# Patient Record
Sex: FEMALE | Race: BLACK OR AFRICAN AMERICAN | Employment: STUDENT | URBAN - METROPOLITAN AREA
[De-identification: names, ages, dates, MRNs, and addresses within clinical notes are randomized per-mention and may not be internally consistent; named-entity substitution may affect disease eponyms.]

---
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
## 2022-06-29 ENCOUNTER — OFFICE VISIT (OUTPATIENT)
Dept: FAMILY MEDICINE CLINIC | Age: 22
End: 2022-06-29
Payer: COMMERCIAL

## 2022-06-29 VITALS
BODY MASS INDEX: 23.19 KG/M2 | DIASTOLIC BLOOD PRESSURE: 58 MMHG | WEIGHT: 126 LBS | RESPIRATION RATE: 16 BRPM | TEMPERATURE: 96.6 F | HEIGHT: 62 IN | OXYGEN SATURATION: 99 % | HEART RATE: 59 BPM | SYSTOLIC BLOOD PRESSURE: 112 MMHG

## 2022-06-29 DIAGNOSIS — Z11.3 SCREEN FOR STD (SEXUALLY TRANSMITTED DISEASE): ICD-10-CM

## 2022-06-29 DIAGNOSIS — N76.0 BV (BACTERIAL VAGINOSIS): Primary | ICD-10-CM

## 2022-06-29 DIAGNOSIS — B96.89 BV (BACTERIAL VAGINOSIS): Primary | ICD-10-CM

## 2022-06-29 LAB
REASON FOR REJECTION: NORMAL
REJECTED TEST: NORMAL

## 2022-06-29 PROCEDURE — 99213 OFFICE O/P EST LOW 20 MIN: CPT | Performed by: NURSE PRACTITIONER

## 2022-06-29 RX ORDER — METRONIDAZOLE 7.5 MG/G
1 GEL VAGINAL NIGHTLY
Qty: 70 G | Refills: 0 | Status: SHIPPED | OUTPATIENT
Start: 2022-06-29 | End: 2022-07-04

## 2022-06-29 SDOH — ECONOMIC STABILITY: FOOD INSECURITY: WITHIN THE PAST 12 MONTHS, YOU WORRIED THAT YOUR FOOD WOULD RUN OUT BEFORE YOU GOT MONEY TO BUY MORE.: NEVER TRUE

## 2022-06-29 SDOH — ECONOMIC STABILITY: FOOD INSECURITY: WITHIN THE PAST 12 MONTHS, THE FOOD YOU BOUGHT JUST DIDN'T LAST AND YOU DIDN'T HAVE MONEY TO GET MORE.: NEVER TRUE

## 2022-06-29 ASSESSMENT — PATIENT HEALTH QUESTIONNAIRE - PHQ9
SUM OF ALL RESPONSES TO PHQ QUESTIONS 1-9: 0
SUM OF ALL RESPONSES TO PHQ9 QUESTIONS 1 & 2: 0
2. FEELING DOWN, DEPRESSED OR HOPELESS: 0
SUM OF ALL RESPONSES TO PHQ QUESTIONS 1-9: 0
1. LITTLE INTEREST OR PLEASURE IN DOING THINGS: 0

## 2022-06-29 ASSESSMENT — ENCOUNTER SYMPTOMS
BACK PAIN: 0
VOMITING: 0
CONSTIPATION: 0
NAUSEA: 0
ABDOMINAL PAIN: 0
DIARRHEA: 0
SORE THROAT: 0

## 2022-06-29 ASSESSMENT — SOCIAL DETERMINANTS OF HEALTH (SDOH): HOW HARD IS IT FOR YOU TO PAY FOR THE VERY BASICS LIKE FOOD, HOUSING, MEDICAL CARE, AND HEATING?: NOT HARD AT ALL

## 2022-06-29 NOTE — PROGRESS NOTES
chlaSubjective:     Patient ID: Michelle Olivier is a 23 y.o. female who presentstoday for:  Chief Complaint   Patient presents with    Annual Exam     Pt. is here for an annual exam, no Pap. Vaginal Discharge  The patient's primary symptoms include a genital odor and vaginal discharge. The patient's pertinent negatives include no genital itching, genital lesions, genital rash, missed menses, pelvic pain or vaginal bleeding. This is a new problem. The current episode started in the past 7 days. The problem occurs daily. The problem has been unchanged. The patient is experiencing no pain. She is not pregnant. Pertinent negatives include no abdominal pain, anorexia, back pain, chills, constipation, diarrhea, discolored urine, dysuria, fever, flank pain, frequency, headaches, hematuria, joint pain, joint swelling, nausea, painful intercourse, rash, sore throat, urgency or vomiting. The vaginal discharge was malodorous. There has been no bleeding. She has not been passing clots. She has not been passing tissue. Nothing aggravates the symptoms. She has tried nothing for the symptoms. She is not sexually active. No, her partner does not have an STD. Her menstrual history has been regular. No past medical history on file. No current outpatient medications on file prior to visit. No current facility-administered medications on file prior to visit. No past surgical history on file. No family history on file.   Social History     Socioeconomic History    Marital status: Single     Spouse name: Not on file    Number of children: Not on file    Years of education: Not on file    Highest education level: Not on file   Occupational History    Not on file   Tobacco Use    Smoking status: Former Smoker     Quit date: 2022     Years since quittin.3    Smokeless tobacco: Never Used   Vaping Use    Vaping Use: Never used   Substance and Sexual Activity    Alcohol use: Never    Drug use: Not on file    Sexual activity: Not on file   Other Topics Concern    Not on file   Social History Narrative    Not on file     Social Determinants of Health     Financial Resource Strain: Low Risk     Difficulty of Paying Living Expenses: Not hard at all   Food Insecurity: No Food Insecurity    Worried About Running Out of Food in the Last Year: Never true    [address] in the Last Year: Never true   Transportation Needs:     Lack of Transportation (Medical): Not on file    Lack of Transportation (Non-Medical): Not on file   Physical Activity:     Days of Exercise per Week: Not on file    Minutes of Exercise per Session: Not on file   Stress:     Feeling of Stress : Not on file   Social Connections:     Frequency of Communication with Friends and Family: Not on file    Frequency of Social Gatherings with Friends and Family: Not on file    Attends Congregational Services: Not on file    Active Member of [address] [place] [address] [employer] or Organizations: Not on file    Attends Club or Organization Meetings: Not on file    Marital Status: Not on file   Intimate Partner Violence:     Fear of Current or Ex-Partner: Not on file    Emotionally Abused: Not on file    Physically Abused: Not on file    Sexually Abused: Not on file   Housing Stability:     Unable to Pay for Housing in the Last Year: Not on file    Number of [place] in the Last Year: Not on file    Unstable Housing in the Last Year: Not on file     Allergies:  Patient has no known allergies. Review of Systems   Constitutional: Negative for chills and fever. HENT: Negative for sore throat. Gastrointestinal: Negative for abdominal pain, anorexia, constipation, diarrhea, nausea and vomiting. Genitourinary: Positive for vaginal discharge. Negative for dysuria, flank pain, frequency, hematuria, missed menses, pelvic pain and urgency. Musculoskeletal: Negative for back pain and joint pain. Skin: Negative for rash. Neurological: Negative for headaches. Objective:    BP (!) 112/58 (Site: Right Upper Arm, Position: Sitting, Cuff Size: Medium Adult)   Pulse 59   Temp (!) 96.6 °F (35.9 °C) (Infrared)   Resp 16   Ht 5' 2\" (1.575 m)   Wt 126 lb (57.2 kg)   SpO2 99%   Breastfeeding No   BMI 23.05 kg/m²     Physical Exam  Constitutional:       General: She is not in acute distress. HENT:      Head: Normocephalic and atraumatic. Mouth/Throat:      Mouth: Mucous membranes are moist.   Eyes:      Conjunctiva/sclera: Conjunctivae normal.   Cardiovascular:      Rate and Rhythm: Normal rate and regular rhythm. Heart sounds: Normal heart sounds. Pulmonary:      Effort: Pulmonary effort is normal. No respiratory distress. Breath sounds: Normal breath sounds. Abdominal:      General: Abdomen is flat. Bowel sounds are normal. There is no distension. Palpations: Abdomen is soft. Tenderness: There is no abdominal tenderness. There is no right CVA tenderness, left CVA tenderness, guarding or rebound. Musculoskeletal:      Cervical back: Neck supple. Right lower leg: No edema. Left lower leg: No edema. Skin:     General: Skin is warm and dry. Findings: No rash. Neurological:      Mental Status: She is alert and oriented to person, place, and time. Psychiatric:         Mood and Affect: Mood normal.         Behavior: Behavior normal.         Assessment & Plan:       Diagnosis Orders   1. BV (bacterial vaginosis)  metroNIDAZOLE (METROGEL) 0.75 % vaginal gel   2.  Screen for STD (sexually transmitted disease)  C.trachomatis N.gonorrhoeae DNA, Urine    Trichomonas Vaginalis Rna, Qual Tma, Pap Via    Rpr    HIV Screen    Hepatitis C Antibody     Orders Placed This Encounter   Procedures    C.trachomatis N.gonorrhoeae DNA, Urine     Standing Status:   Future     Standing Expiration Date:   6/29/2023    Trichomonas Vaginalis Rna, Qual Tma, Pap Via     Standing Status:   Future     Standing Expiration Date:   6/29/2023    Rpr Standing Status:   Future     Standing Expiration Date:   6/29/2023    HIV Screen     Standing Status:   Future     Standing Expiration Date:   6/29/2023    Hepatitis C Antibody     Standing Status:   Future     Standing Expiration Date:   6/29/2023     Orders Placed This Encounter   Medications    metroNIDAZOLE (METROGEL) 0.75 % vaginal gel     Sig: Place 1 Applicatorful vaginally at bedtime for 5 days     Dispense:  70 g     Refill:  0     There are no discontinued medications. No follow-ups on file. Reviewed with the patient: currentclinical status, medications, activities and diet. Side effects, adverse effects of the medicationprescribed today, as well as treatment plan/ rationale and result expectations havebeen discussed with the patient who expresses understanding and desires to proceed. Pt instructions reviewed and given to patient.     Close follow up to evaluate treatment resultsand for coordination of care. I have reviewed the patient's medical historyin detail and updated the computerized patient record.     Warren Pringle, APRN - CNP

## 2022-06-30 ENCOUNTER — TELEPHONE (OUTPATIENT)
Dept: FAMILY MEDICINE CLINIC | Age: 22
End: 2022-06-30

## 2022-06-30 DIAGNOSIS — Z11.3 SCREEN FOR STD (SEXUALLY TRANSMITTED DISEASE): Primary | ICD-10-CM

## 2022-07-05 ENCOUNTER — OFFICE VISIT (OUTPATIENT)
Dept: FAMILY MEDICINE CLINIC | Age: 22
End: 2022-07-05
Payer: OTHER GOVERNMENT

## 2022-07-05 VITALS
HEIGHT: 62 IN | HEART RATE: 83 BPM | WEIGHT: 128 LBS | OXYGEN SATURATION: 97 % | TEMPERATURE: 97.3 F | BODY MASS INDEX: 23.55 KG/M2 | DIASTOLIC BLOOD PRESSURE: 60 MMHG | SYSTOLIC BLOOD PRESSURE: 110 MMHG

## 2022-07-05 DIAGNOSIS — N89.8 VAGINAL DISCHARGE: ICD-10-CM

## 2022-07-05 DIAGNOSIS — A54.9 GONORRHEA: ICD-10-CM

## 2022-07-05 DIAGNOSIS — R10.2 PELVIC PAIN: Primary | ICD-10-CM

## 2022-07-05 DIAGNOSIS — R10.2 PELVIC PAIN: ICD-10-CM

## 2022-07-05 DIAGNOSIS — Z76.89 ENCOUNTER TO ESTABLISH CARE: ICD-10-CM

## 2022-07-05 LAB
BILIRUBIN, POC: ABNORMAL
BLOOD URINE, POC: ABNORMAL
CLARITY, POC: CLEAR
COLOR, POC: YELLOW
GLUCOSE URINE, POC: ABNORMAL
KETONES, POC: ABNORMAL
LEUKOCYTE EST, POC: 15
NITRITE, POC: ABNORMAL
PH, POC: 6
PROTEIN, POC: ABNORMAL
SPECIFIC GRAVITY, POC: 1.03
UROBILINOGEN, POC: 3.5

## 2022-07-05 PROCEDURE — 81003 URINALYSIS AUTO W/O SCOPE: CPT | Performed by: NURSE PRACTITIONER

## 2022-07-05 PROCEDURE — 99214 OFFICE O/P EST MOD 30 MIN: CPT | Performed by: NURSE PRACTITIONER

## 2022-07-05 NOTE — PATIENT INSTRUCTIONS
Patient Education        Pelvic Pain: Care Instructions  Your Care Instructions     Pelvic pain, or pain in the lower belly, can have many causes. Often pelvic pain is not serious and gets better in a few days. If your pain continues or gets worse, you may need tests and treatment. Tell your doctor about any newsymptoms. These may be signs of a serious problem. Follow-up care is a key part of your treatment and safety. Be sure to make and go to all appointments, and call your doctor if you are having problems. It's also a good idea to know your test results and keep alist of the medicines you take. How can you care for yourself at home?  Rest until you feel better. Lie down, and raise your legs by placing a pillow under your knees.  Drink plenty of fluids. You may find that small, frequent sips are easier on your stomach than if you drink a lot at once. Avoid drinks with carbonation or caffeine, such as soda pop, tea, or coffee.  Try eating several small meals instead of 2 or 3 large ones. Eat mild foods, such as rice, dry toast or crackers, bananas, and applesauce. Avoid fatty and spicy foods, other fruits, and alcohol until 48 hours after your symptoms have gone away.  Take an over-the-counter pain medicine, such as acetaminophen (Tylenol), ibuprofen (Advil, Motrin), or naproxen (Aleve). Read and follow all instructions on the label.  Do not take two or more pain medicines at the same time unless the doctor told you to. Many pain medicines have acetaminophen, which is Tylenol. Too much acetaminophen (Tylenol) can be harmful.  You can put a heating pad, a warm cloth, or moist heat on your belly to relieve pain. When should you call for help? Call 911  anytime you think you may need emergency care. For example, call if:     You passed out (lost consciousness). Call your doctor now or seek immediate medical care if:     You have a new or higher fever.      You have unusual vaginal bleeding.    You have new or worse belly or pelvic pain.      You have vaginal discharge that has increased in amount or smells bad.      You are dizzy or lightheaded, or you feel like you may faint.      You have symptoms of sepsis, such as:  ? Shortness of breath. ? Feeling very sick. ? Severe pain. ? A fast heart rate. ? Cool, pale, or clammy skin. ? Feeling confused. ? Feeling very sleepy, or you are hard to wake up. Watch closely for changes in your health, and be sure to contact your doctor if:     You do not get better as expected. Where can you learn more? Go to [email or web address].Photos to Photos. org and sign in to your [employer] account. Enter [phone] in the Cloud[employer] box to learn more about \"Pelvic Pain: Care Instructions. \"     If you do not have an account, please click on the \"Sign Up Now\" link. Current as of: November 22, 2021               Content Version: 13.3  © 4566-4037 Healthwise, Incorporated. Care instructions adapted under license by [facility] ([facility]). If you have questions about a medical condition or this instruction, always ask your healthcare professional. [address] any warranty or liability for your use of this information.

## 2022-07-05 NOTE — PROGRESS NOTES
Subjective:      Patient ID: Tharon Harada is a 23 y.o. female who presents today for:  Chief Complaint   Patient presents with    Other     Patient states pain in the pelvis area on the right side been happening for 2 weeks. Ramone White HPI      EVery right step it hurts, especially after walking all day   This has been for 2 weeks   It has progressively gotten worse   No back pain   Constantly there but worse with activity   shes never had a pelvic exam   shes never been pregnant   Her periods are pretty normal   She went off birth control in August and they are regulating   She is having abnormal discharge   The color is green   It smells a little   No trouble urinating   Pain with intercourse   No bleeding after intercourse   Feels like her cervix is very tender   Like the very middle is very tender   She never did the Flagy gel that she was recently prescribed  She does not have a hx of BV   No burning or frequency of urination   LMP    Had it for one week and was done and then it came back         History reviewed. No pertinent past medical history. History reviewed. No pertinent surgical history.   Social History     Socioeconomic History    Marital status: Single     Spouse name: Not on file    Number of children: Not on file    Years of education: Not on file    Highest education level: Not on file   Occupational History    Not on file   Tobacco Use    Smoking status: Former Smoker     Quit date: 2022     Years since quittin.3    Smokeless tobacco: Never Used   Vaping Use    Vaping Use: Never used   Substance and Sexual Activity    Alcohol use: Never    Drug use: Not on file    Sexual activity: Not on file   Other Topics Concern    Not on file   Social History Narrative    Not on file     Social Determinants of Health     Financial Resource Strain: Low Risk     Difficulty of Paying Living Expenses: Not hard at all   Food Insecurity: No Food Insecurity    Worried About Running Out of Food in the Last Year: Never true    [address] in the Last Year: Never true   Transportation Needs:     Lack of Transportation (Medical): Not on file    Lack of Transportation (Non-Medical): Not on file   Physical Activity:     Days of Exercise per Week: Not on file    Minutes of Exercise per Session: Not on file   Stress:     Feeling of Stress : Not on file   Social Connections:     Frequency of Communication with Friends and Family: Not on file    Frequency of Social Gatherings with Friends and Family: Not on file    Attends Orthodoxy Services: Not on file    Active Member of [address] [place] [address] or Organizations: Not on file    Attends Club or Organization Meetings: Not on file    Marital Status: Not on file   Intimate Partner Violence:     Fear of Current or Ex-Partner: Not on file    Emotionally Abused: Not on file    Physically Abused: Not on file    Sexually Abused: Not on file   Housing Stability:     Unable to Pay for Housing in the Last Year: Not on file    Number of [place] in the Last Year: Not on file    Unstable Housing in the Last Year: Not on file     History reviewed. No pertinent family history. No Known Allergies  Current Outpatient Medications   Medication Sig Dispense Refill    metroNIDAZOLE (FLAGYL) 500 MG tablet Take 1 tablet by mouth 2 times daily for 7 days 14 tablet 0     Current Facility-Administered Medications   Medication Dose Route Frequency Provider Last Rate Last Admin    [START ON 7/15/2022] cefTRIAXone (ROCEPHIN) injection 500 mg  500 mg IntraMUSCular Once Marsha Diaz, APRN - CNP              Review of Systems   Constitutional: Negative for chills, fatigue and fever. HENT: Negative for congestion, rhinorrhea and sore throat. Respiratory: Negative for cough, shortness of breath and wheezing. Gastrointestinal: Negative for diarrhea, nausea and vomiting. Genitourinary: Positive for pelvic pain, vaginal discharge and vaginal pain.  Negative for difficulty urinating, dysuria, flank pain, frequency and vaginal bleeding. Musculoskeletal: Negative for myalgias. Skin: Negative for rash. Neurological: Negative for headaches. Psychiatric/Behavioral: Negative for agitation, confusion and hallucinations. Objective:   /60   Pulse 83   Temp 97.3 °F (36.3 °C) (Infrared)   Ht 5' 2\" (1.575 m)   Wt 128 lb (58.1 kg)   SpO2 97%   BMI 23.41 kg/m²     Physical Exam  Vitals reviewed. Chaperone present: Pt declined    Constitutional:       Appearance: Normal appearance. HENT:      Head: Normocephalic and atraumatic. Nose: Nose normal.      Mouth/Throat:      Lips: Pink. Eyes:      General: Lids are normal.      Conjunctiva/sclera: Conjunctivae normal.   Cardiovascular:      Rate and Rhythm: Normal rate. Pulmonary:      Effort: Pulmonary effort is normal.   Abdominal:      Tenderness: There is no abdominal tenderness. There is no right CVA tenderness, left CVA tenderness or guarding. Genitourinary:     Exam position: Lithotomy position. Pubic Area: No rash. Labia:         Right: No rash. Left: No rash. Urethra: No urethral swelling. Vagina: Vaginal discharge present. Cervix: Discharge and erythema present. No cervical motion tenderness, friability or cervical bleeding. Adnexa:         Right: Tenderness present. No mass. Left: No mass or tenderness. Comments: Some white milky discharge and some clear discharge   Musculoskeletal:      Cervical back: Normal range of motion. Skin:     General: Skin is warm and dry. Findings: No rash. Neurological:      General: No focal deficit present. Mental Status: She is alert and oriented to person, place, and time. Psychiatric:         Mood and Affect: Mood normal.         Behavior: Behavior normal. Behavior is cooperative. Assessment:       Diagnosis Orders   1.  Pelvic pain  [address] [facility], [place], [place], N.gonorrhoeae DNA     Standing Status:   Future     Number of Occurrences:   1     Standing Expiration Date:   7/5/2023    Trichomonas Vaginalis Rna, Qual Tma, Pap Via     Standing Status:   Future     Number of Occurrences:   1     Standing Expiration Date:   7/5/2023   [address], [facility], [facility]     Referral Priority:   Routine     Referral Type:   Eval and Treat     Referral Reason:   Specialty Services Required     Referred to Provider:   Melinda Juárez DO     Requested Specialty:   Obstetrics & Gynecology     Number of Visits Requested:   1    POCT Urinalysis No Micro (Auto)     Orders Placed This Encounter   Medications    cefTRIAXone (ROCEPHIN) injection 500 mg     Order Specific Question:   Antimicrobial Indications     Answer:   OB/Gyn Infection     Order Specific Question:   Suspected Organism(s)     Answer:   Gonorrhea     There are no discontinued medications. Return for Follow up with PCP, OBGYN 2 weeks. Reviewed with the patient/family: current clinical status & medications. Side effects of the medication prescribed today, as well as treatment plan/rationale and result expectations have been discussed with the patient/family who expresses understanding. Patient will be discharged home in stable condition. Follow up with PCP to evaluate treatment results or return if symptoms worsen or fail to improve. Discussed signs and symptoms which require immediate follow-up in ED/call to 911. Understanding verbalized. I have reviewed the patient's medical history in detail and updated the computerized patient record.     DIMITRIOS Kim - CNP

## 2022-07-06 ENCOUNTER — TELEPHONE (OUTPATIENT)
Dept: FAMILY MEDICINE CLINIC | Age: 22
End: 2022-07-06

## 2022-07-06 DIAGNOSIS — N76.0 BACTERIAL VAGINOSIS: Primary | ICD-10-CM

## 2022-07-06 DIAGNOSIS — B96.89 BACTERIAL VAGINOSIS: Primary | ICD-10-CM

## 2022-07-06 LAB
CLUE CELLS: ABNORMAL
TRICHOMONAS PREP: ABNORMAL
TRICHOMONAS VAGINALIS SCREEN: NEGATIVE
YEAST WET PREP: ABNORMAL

## 2022-07-06 RX ORDER — METRONIDAZOLE 500 MG/1
500 TABLET ORAL 2 TIMES DAILY
Qty: 14 TABLET | Refills: 0 | Status: SHIPPED | OUTPATIENT
Start: 2022-07-06 | End: 2022-07-13

## 2022-07-07 LAB — URINE CULTURE, ROUTINE: NORMAL

## 2022-07-08 LAB
C TRACH DNA GENITAL QL NAA+PROBE: NEGATIVE
N. GONORRHOEAE DNA: POSITIVE
SPECIMEN SOURCE: NORMAL
T. VAGINALIS AMPLIFIED: NEGATIVE

## 2022-07-12 LAB
C. TRACHOMATIS DNA ,URINE: NEGATIVE
N. GONORRHOEAE DNA, URINE: POSITIVE

## 2022-07-13 RX ORDER — CEFTRIAXONE 1 G/1
500 INJECTION, POWDER, FOR SOLUTION INTRAMUSCULAR; INTRAVENOUS ONCE
Status: SHIPPED | OUTPATIENT
Start: 2022-07-15

## 2022-07-13 ASSESSMENT — ENCOUNTER SYMPTOMS
SORE THROAT: 0
VOMITING: 0
DIARRHEA: 0
RHINORRHEA: 0
COUGH: 0
NAUSEA: 0
WHEEZING: 0
SHORTNESS OF BREATH: 0

## 2022-07-20 ENCOUNTER — TELEPHONE (OUTPATIENT)
Dept: FAMILY MEDICINE CLINIC | Age: 22
End: 2022-07-20

## 2022-07-20 NOTE — TELEPHONE ENCOUNTER
----- Message from Yolanda Altman sent at 7/19/2022  2:01 PM EDT -----  Subject: Message to Provider    QUESTIONS  Information for Provider? Pt is calling in and stated got test results but   was told by provider that pt did not need a appointment for treatment but   just to check in . If someone can give the pt a call .   ---------------------------------------------------------------------------  --------------  Ky Fraction INFO  [phone]; OK to leave message on voicemail  ---------------------------------------------------------------------------  --------------  SCRIPT ANSWERS  Relationship to Patient?  Self

## 2022-10-02 ENCOUNTER — HOSPITAL ENCOUNTER (EMERGENCY)
Age: 22
Discharge: HOME | End: 2022-10-02
Attending: EMERGENCY MEDICINE
Payer: OTHER GOVERNMENT

## 2022-10-02 VITALS
TEMPERATURE: 98.1 F | RESPIRATION RATE: 16 BRPM | OXYGEN SATURATION: 100 % | HEIGHT: 61 IN | HEART RATE: 66 BPM | DIASTOLIC BLOOD PRESSURE: 75 MMHG | BODY MASS INDEX: 24.55 KG/M2 | WEIGHT: 130 LBS | SYSTOLIC BLOOD PRESSURE: 117 MMHG

## 2022-10-02 DIAGNOSIS — H10.89 CONTACT LENS RELATED CONJUNCTIVITIS: Primary | ICD-10-CM
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
## 2022-10-02 PROCEDURE — 99283 EMERGENCY DEPT VISIT LOW MDM: CPT

## 2022-10-02 PROCEDURE — 6370000000 HC RX 637 (ALT 250 FOR IP): Performed by: EMERGENCY MEDICINE

## 2022-10-02 RX ORDER — CIPROFLOXACIN HYDROCHLORIDE 3.5 MG/ML
2 SOLUTION/ DROPS TOPICAL
Qty: 1 EACH | Refills: 0 | Status: SHIPPED | OUTPATIENT
Start: 2022-10-02 | End: 2022-10-04

## 2022-10-02 RX ADMIN — FLUORESCEIN SODIUM 1 MG: 1 STRIP OPHTHALMIC at 13:35

## 2022-10-02 ASSESSMENT — LIFESTYLE VARIABLES
HOW OFTEN DO YOU HAVE A DRINK CONTAINING ALCOHOL: NEVER
HOW MANY STANDARD DRINKS CONTAINING ALCOHOL DO YOU HAVE ON A TYPICAL DAY: PATIENT DOES NOT DRINK

## 2022-10-02 ASSESSMENT — PAIN - FUNCTIONAL ASSESSMENT: PAIN_FUNCTIONAL_ASSESSMENT: 0-10

## 2022-10-02 ASSESSMENT — ENCOUNTER SYMPTOMS
EYE PAIN: 0
SORE THROAT: 0
VOMITING: 0
SHORTNESS OF BREATH: 0
ABDOMINAL PAIN: 0
EYE REDNESS: 1
CHEST TIGHTNESS: 0
EYE DISCHARGE: 1
NAUSEA: 0

## 2022-10-02 ASSESSMENT — PAIN DESCRIPTION - DESCRIPTORS: DESCRIPTORS: ACHING

## 2022-10-02 ASSESSMENT — PAIN DESCRIPTION - PAIN TYPE: TYPE: ACUTE PAIN

## 2022-10-02 ASSESSMENT — PAIN DESCRIPTION - LOCATION: LOCATION: EYE

## 2022-10-02 ASSESSMENT — PAIN DESCRIPTION - FREQUENCY: FREQUENCY: CONTINUOUS

## 2022-10-02 ASSESSMENT — PAIN DESCRIPTION - ONSET: ONSET: SUDDEN

## 2022-10-02 ASSESSMENT — PAIN DESCRIPTION - ORIENTATION: ORIENTATION: RIGHT

## 2022-10-02 ASSESSMENT — PAIN SCALES - GENERAL: PAINLEVEL_OUTOF10: 4

## 2022-10-02 NOTE — ED TRIAGE NOTES
Pt arrived c/o both eye contacts stuck in her eyes, pt states they have been in for three days, the right eye hurts.

## 2022-10-02 NOTE — ED PROVIDER NOTES
[address] [facility] ED  EMERGENCY DEPARTMENT ENCOUNTER      Pt Name: Christie Virgen  MRN: [id number]  [place] 2000  Date of evaluation: 10/2/2022  Provider: Ainsley Thompson, [address] [place] [address]       Chief Complaint   Patient presents with    Eye Problem     Contact lens         HISTORY OF PRESENT ILLNESS   (Location/Symptom, Timing/Onset, Context/Setting, Quality, Duration, Modifying Factors, Severity)  Note limiting factors. Christie Virgen is a 23 y.o. female who presents to the emergency department . Comes in because she could not get her contact lenses out for the last 3 days. She states she usually wears glasses and never sleeps in her contacts. She is been trying to get the contact out more so in the right eye and the right eye is very red and irritated. Eye is tearing but there is no purulent drainage. Vision is not impaired    HPI    Nursing Notes were reviewed. REVIEW OF SYSTEMS    (2-9 systems for level 4, 10 or more for level 5)     Review of Systems   Constitutional:  Negative for activity change, appetite change and fatigue. HENT:  Negative for congestion and sore throat. Eyes:  Positive for discharge and redness. Negative for pain and visual disturbance. Respiratory:  Negative for chest tightness and shortness of breath. Cardiovascular:  Negative for chest pain. Gastrointestinal:  Negative for abdominal pain, nausea and vomiting. Endocrine: Negative for polydipsia. Genitourinary:  Negative for flank pain and urgency. Musculoskeletal:  Negative for gait problem and neck stiffness. Skin:  Negative for rash. Neurological:  Negative for weakness, light-headedness and headaches. Psychiatric/Behavioral:  Negative for confusion and sleep disturbance. Except as noted above the remainder of the review of systems was reviewed and negative. PAST MEDICAL HISTORY   History reviewed. No pertinent past medical history. SURGICAL HISTORY     History reviewed. No pertinent surgical history. CURRENT MEDICATIONS       Discharge Medication List as of 10/2/2022  2:24 PM          ALLERGIES     Patient has no known allergies. FAMILY HISTORY     History reviewed. No pertinent family history. SOCIAL HISTORY       Social History     Socioeconomic History    Marital status: Single     Spouse name: None    Number of children: None    Years of education: None    Highest education level: None   Tobacco Use    Smoking status: Former     Types: Cigarettes     Quit date: 2022     Years since quittin.5    Smokeless tobacco: Never   Vaping Use    Vaping Use: Never used   Substance and Sexual Activity    Alcohol use: Never     Social Determinants of Health     Financial Resource Strain: Low Risk     Difficulty of Paying Living Expenses: Not hard at all   Food Insecurity: No Food Insecurity    Worried About [address] in the Last Year: Never true    [address] in the Last Year: Never true       SCREENINGS        [place] Coma Scale  Eye Opening: Spontaneous  Best Verbal Response: Oriented  Best Motor Response: Obeys commands  [place] Coma Scale Score: 15               PHYSICAL EXAM    (up to 7 for level 4, 8 or more for level 5)     ED Triage Vitals [10/02/22 1308]   BP Temp Temp Source Heart Rate Resp SpO2 Height Weight   117/75 98.1 °F (36.7 °C) Oral 66 16 100 % 5' 1\" (1.549 m) 130 lb (59 kg)       Physical Exam  Constitutional:       General: She is not in acute distress. Appearance: She is well-developed. She is not diaphoretic. HENT:      Head: Normocephalic and atraumatic. Right Ear: External ear normal.      Left Ear: External ear normal.      Nose: Nose normal.      Mouth/Throat:      Mouth: Mucous membranes are moist.      Pharynx: No oropharyngeal exudate. Eyes:      Extraocular Movements: Extraocular movements intact. Pupils: Pupils are equal, round, and reactive to light. Comments: Right I injected.   No purulent drainage. There are no contact lenses found in either eye nor in the lids. I  both eyes with fluorescein and also did not see abrasions nor contacts. Neck:      Thyroid: No thyromegaly. Vascular: No JVD. Trachea: No tracheal deviation. Cardiovascular:      Rate and Rhythm: Normal rate. Heart sounds: Normal heart sounds. No murmur heard. Pulmonary:      Effort: Pulmonary effort is normal. No respiratory distress. Breath sounds: Normal breath sounds. No wheezing. Abdominal:      General: Bowel sounds are normal.      Palpations: Abdomen is soft. Tenderness: There is no abdominal tenderness. There is no guarding. Musculoskeletal:         General: Normal range of motion. Cervical back: Normal range of motion and neck supple. Right lower leg: No edema. Left lower leg: No edema. Skin:     General: Skin is warm and dry. Findings: No rash. Neurological:      Mental Status: She is alert and oriented to person, place, and time. Cranial Nerves: No cranial nerve deficit. Psychiatric:         Behavior: Behavior normal.       DIAGNOSTIC RESULTS     EKG: All EKG's are interpreted by the Emergency Department Physician who either signs or Co-signs this chart in the absence of a cardiologist.        RADIOLOGY:   Non-plain film images such as CT, Ultrasound and MRI are read by the radiologist. Plain radiographic images are visualized and preliminarily interpreted by the emergency physician with the below findings:        Interpretation per the Radiologist below, if available at the time of this note:    No orders to display         ED BEDSIDE ULTRASOUND:   Performed by ED Physician - none    LABS:  Labs Reviewed - No data to display    All other labs were within normal range or not returned as of this dictation.     EMERGENCY DEPARTMENT COURSE and DIFFERENTIAL DIAGNOSIS/MDM:   Vitals:    Vitals:    10/02/22 1308   BP: 117/75   Pulse: 66   Resp: 16   Temp: 98.1 °F (36.7 °C)   TempSrc: Oral   SpO2: 100%   Weight: 130 lb (59 kg)   Height: 5' 1\" (1.549 m)       Patient came in with eye irritation and thought that both contacts were still in her eyes and she could not get them out. I did not find any contacts nor did she have corneal abrasions. She does have mild conjunctivitis in the right eye. I did prescribe Ciloxan drops. MDM        REASSESSMENT          CRITICAL CARE TIME   Total Critical Care time was 0 minutes, excluding separately reportable procedures. There was a high probability of clinically significant/life threatening deterioration in the patient's condition which required my urgent intervention. CONSULTS:  None    PROCEDURES:  Unless otherwise noted below, none     Procedures      FINAL IMPRESSION      1. Contact lens related conjunctivitis          DISPOSITION/PLAN   DISPOSITION Decision To Discharge 10/02/2022 02:23:10 PM      PATIENT REFERRED TO:  DIMITRIOS Arellano - [facility]  [id number] [phone]      As needed      DISCHARGE MEDICATIONS:  Discharge Medication List as of 10/2/2022  2:24 PM        START taking these medications    Details   ciprofloxacin (CILOXAN) 0.3 % ophthalmic solution Place 2 drops into both eyes every 2 hours for 2 days Then 2 drops in affected eye every 4 hours for 5 days, Disp-1 each, R-0Normal           Controlled Substances Monitoring:     No flowsheet data found.     (Please note that portions of this note were completed with a voice recognition program.  Efforts were made to edit the dictations but occasionally words are mis-transcribed.)    Ashli Hernandez DO (electronically signed)  Attending Emergency Physician            Ashli Hernandez DO  10/02/22 9991

## 2022-11-28 ENCOUNTER — OFFICE VISIT (OUTPATIENT)
Dept: FAMILY MEDICINE CLINIC | Age: 22
End: 2022-11-28
Payer: OTHER GOVERNMENT

## 2022-11-28 VITALS
TEMPERATURE: 97.5 F | RESPIRATION RATE: 16 BRPM | BODY MASS INDEX: 25.11 KG/M2 | OXYGEN SATURATION: 99 % | HEIGHT: 61 IN | WEIGHT: 133 LBS | SYSTOLIC BLOOD PRESSURE: 108 MMHG | DIASTOLIC BLOOD PRESSURE: 80 MMHG | HEART RATE: 85 BPM

## 2022-11-28 DIAGNOSIS — N89.8 VAGINAL DISCHARGE: ICD-10-CM

## 2022-11-28 DIAGNOSIS — Z20.2 POSSIBLE EXPOSURE TO STD: Primary | ICD-10-CM

## 2022-11-28 DIAGNOSIS — Z20.2 POSSIBLE EXPOSURE TO STD: ICD-10-CM

## 2022-11-28 PROCEDURE — 99214 OFFICE O/P EST MOD 30 MIN: CPT | Performed by: NURSE PRACTITIONER

## 2022-11-28 RX ORDER — CEFTRIAXONE 500 MG/1
500 INJECTION, POWDER, FOR SOLUTION INTRAMUSCULAR; INTRAVENOUS ONCE
Qty: 1 EACH | Refills: 0
Start: 2022-11-28 | End: 2022-12-06 | Stop reason: CLARIF

## 2022-11-28 NOTE — PROGRESS NOTES
Subjective:      Patient ID: Rose Townsend is a 23 y.o. female who presents today for:  Chief Complaint   Patient presents with    Other     Possible STD, has white discharge with a slight odor, feels like her uterus is heavy,has pain in lower abdomen, has frequency with urination as well as urgency        HPI      More discharge then normal  Its white  Kind of thick   Does not have a bad odor  Smells acidic   Pelvic discomfort   Shes not had BV before   Shes not having painful intercourse   She is having frequency of urination   Like a rush   Like she has to go   Her bladder is always like but this seems like more urgency then normal   Had Gonorrhea before and worried that its that   Discharge seems similar to that         History reviewed. No pertinent past medical history. History reviewed. No pertinent surgical history.   Social History     Socioeconomic History    Marital status: Single     Spouse name: Not on file    Number of children: Not on file    Years of education: Not on file    Highest education level: Not on file   Occupational History    Not on file   Tobacco Use    Smoking status: Former     Types: Cigarettes     Quit date: 2022     Years since quittin.7    Smokeless tobacco: Never   Vaping Use    Vaping Use: Never used   Substance and Sexual Activity    Alcohol use: Never    Drug use: Not on file    Sexual activity: Not on file   Other Topics Concern    Not on file   Social History Narrative    Not on file     Social Determinants of Health     Financial Resource Strain: Low Risk     Difficulty of Paying Living Expenses: Not hard at all   Food Insecurity: No Food Insecurity    Worried About Running Out of Food in the Last Year: Never true    Ran Out of Food in the Last Year: Never true   Transportation Needs: Not on file   Physical Activity: Not on file   Stress: Not on file   Social Connections: Not on file   Intimate Partner Violence: Not on file   Housing Stability: Not on file History reviewed. No pertinent family history. No Known Allergies  Current Outpatient Medications   Medication Sig Dispense Refill    metroNIDAZOLE (FLAGYL) 500 MG tablet Take 1 tablet by mouth 2 times daily for 7 days 14 tablet 0     No current facility-administered medications for this visit. Review of Systems   Constitutional:  Negative for chills, fatigue and fever. HENT:  Negative for congestion, rhinorrhea and sore throat. Respiratory:  Negative for cough, shortness of breath and wheezing. Gastrointestinal:  Negative for diarrhea, nausea and vomiting. Genitourinary:  Positive for frequency, pelvic pain, urgency and vaginal discharge. Musculoskeletal:  Negative for myalgias. Skin:  Negative for rash. Neurological:  Negative for dizziness, light-headedness and headaches. Psychiatric/Behavioral:  Negative for agitation, confusion and hallucinations. Objective:   /80 (Site: Left Upper Arm, Position: Sitting, Cuff Size: Medium Adult)   Pulse 85   Temp 97.5 °F (36.4 °C) (Temporal)   Resp 16   Ht 5' 1\" (1.549 m)   Wt 133 lb (60.3 kg)   SpO2 99%   BMI 25.13 kg/m²     Physical Exam  Vitals reviewed. Chaperone present: Pt declined. Constitutional:       Appearance: Normal appearance. HENT:      Head: Normocephalic and atraumatic. Nose: Nose normal.      Mouth/Throat:      Lips: Pink. Eyes:      General: Lids are normal.      Conjunctiva/sclera: Conjunctivae normal.   Cardiovascular:      Rate and Rhythm: Normal rate. Pulmonary:      Effort: Pulmonary effort is normal.   Abdominal:      Tenderness: There is no abdominal tenderness. There is no right CVA tenderness, left CVA tenderness or guarding. Genitourinary:     Exam position: Lithotomy position. Pubic Area: No rash. Labia:         Right: No rash. Left: No rash. Urethra: No prolapse or urethral swelling. Vagina: Vaginal discharge (white) present.       Cervix: Erythema present. No cervical bleeding. Musculoskeletal:      Cervical back: Normal range of motion. Skin:     General: Skin is warm and dry. Findings: No rash. Neurological:      General: No focal deficit present. Mental Status: She is alert and oriented to person, place, and time. Psychiatric:         Mood and Affect: Mood normal.         Behavior: Behavior normal. Behavior is cooperative. Assessment:       Diagnosis Orders   1. Possible exposure to STD  Wet Prep, Genital    C.trachomatis N.gonorrhoeae DNA, Urine    Culture, Urine    DISCONTINUED: cefTRIAXone (ROCEPHIN) 500 MG injection      2. Vaginal discharge  Wet Prep, Genital    C.trachomatis N.gonorrhoeae DNA, Urine    DISCONTINUED: cefTRIAXone (ROCEPHIN) 500 MG injection        No results found for this visit on 11/28/22. Plan: At first pt wanted treated ahead of time for STDs, then she changed her mind and would like to wait until testing comes back. Will hold off on any medications for now ans await results. Assessment & Plan   Negar Silver was seen today for other. Diagnoses and all orders for this visit:    Possible exposure to STD  -     Discontinue: cefTRIAXone (ROCEPHIN) 500 MG injection; Inject 500 mg into the muscle once for 1 dose  -     Wet Prep, Genital; Future  -     C.trachomatis N.gonorrhoeae DNA, Urine; Future  -     Culture, Urine; Future    Vaginal discharge  -     Discontinue: cefTRIAXone (ROCEPHIN) 500 MG injection; Inject 500 mg into the muscle once for 1 dose  -     Wet Prep, Genital; Future  -     C.trachomatis N.gonorrhoeae DNA, Urine;  Future    Orders Placed This Encounter   Procedures    Wet Prep, Genital     Standing Status:   Future     Number of Occurrences:   1     Standing Expiration Date:   11/28/2023    C.trachomatis N.gonorrhoeae DNA, Urine     Standing Status:   Future     Number of Occurrences:   1     Standing Expiration Date:   11/28/2023    Culture, Urine     Standing Status:   Future Number of Occurrences:   1     Standing Expiration Date:   11/28/2023     Order Specific Question:   Specify (ex-cath, midstream, cysto, etc)? Answer:   midstream       Orders Placed This Encounter   Medications    DISCONTD: cefTRIAXone (ROCEPHIN) 500 MG injection     Sig: Inject 500 mg into the muscle once for 1 dose     Dispense:  1 each     Refill:  0       Medications Discontinued During This Encounter   Medication Reason    cefTRIAXone (ROCEPHIN) injection 500 mg     cefTRIAXone (ROCEPHIN) 500 MG injection ERROR     Return if symptoms worsen or fail to improve. Reviewed with the patient/family: current clinical status & medications. Side effects of the medication prescribed today, as well as treatment plan/rationale and result expectations have been discussed with the patient/family who expresses understanding. Patient will be discharged home in stable condition. Follow up with PCP to evaluate treatment results or return if symptoms worsen or fail to improve. Discussed signs and symptoms which require immediate follow-up in ED/call to 911. Understanding verbalized. I have reviewed the patient's medical history in detail and updated the computerized patient record.     Komal Sahu, APRN - CNP

## 2022-11-29 LAB
CLUE CELLS: ABNORMAL
TRICHOMONAS PREP: ABNORMAL
TRICHOMONAS VAGINALIS SCREEN: NEGATIVE
URINE CULTURE, ROUTINE: NORMAL
YEAST WET PREP: ABNORMAL

## 2022-11-30 DIAGNOSIS — B96.89 BACTERIAL VAGINOSIS: Primary | ICD-10-CM

## 2022-11-30 DIAGNOSIS — N76.0 BACTERIAL VAGINOSIS: Primary | ICD-10-CM

## 2022-11-30 RX ORDER — METRONIDAZOLE 500 MG/1
500 TABLET ORAL 2 TIMES DAILY
Qty: 14 TABLET | Refills: 0 | Status: SHIPPED | OUTPATIENT
Start: 2022-11-30 | End: 2022-12-07

## 2022-12-03 LAB
C. TRACHOMATIS DNA ,URINE: NEGATIVE
N. GONORRHOEAE DNA, URINE: NEGATIVE

## 2022-12-06 ASSESSMENT — ENCOUNTER SYMPTOMS
DIARRHEA: 0
RHINORRHEA: 0
COUGH: 0
SHORTNESS OF BREATH: 0
WHEEZING: 0
VOMITING: 0
NAUSEA: 0
SORE THROAT: 0

## 2022-12-28 ENCOUNTER — TELEPHONE (OUTPATIENT)
Dept: FAMILY MEDICINE CLINIC | Age: 22
End: 2022-12-28

## 2022-12-28 DIAGNOSIS — Z78.9 VARICELLA VACCINATION STATUS UNKNOWN: Primary | ICD-10-CM

## 2022-12-28 NOTE — TELEPHONE ENCOUNTER
AUGUSTUSM for pt to inform her that orders had been sent for varicella titers , and that she should call to schedule an appt with Lizbeth Murry

## 2022-12-29 DIAGNOSIS — Z78.9 VARICELLA VACCINATION STATUS UNKNOWN: ICD-10-CM

## 2022-12-31 LAB — VZV IGG SER QL IA: 767.2 IV

## 2023-02-22 DIAGNOSIS — B96.89 BACTERIAL VAGINOSIS: ICD-10-CM

## 2023-02-22 DIAGNOSIS — N76.0 BACTERIAL VAGINOSIS: ICD-10-CM

## 2023-02-22 RX ORDER — METRONIDAZOLE 500 MG/1
TABLET ORAL
Qty: 14 TABLET | Refills: 0 | Status: SHIPPED | OUTPATIENT
Start: 2023-02-22

## 2023-03-24 ENCOUNTER — APPOINTMENT (OUTPATIENT)
Dept: CT IMAGING | Age: 23
End: 2023-03-24
Payer: OTHER MISCELLANEOUS

## 2023-03-24 ENCOUNTER — HOSPITAL ENCOUNTER (EMERGENCY)
Age: 23
Discharge: HOME | End: 2023-03-24
Payer: OTHER MISCELLANEOUS

## 2023-03-24 ENCOUNTER — APPOINTMENT (OUTPATIENT)
Dept: GENERAL RADIOLOGY | Age: 23
End: 2023-03-24
Payer: OTHER MISCELLANEOUS

## 2023-03-24 VITALS
BODY MASS INDEX: 24.84 KG/M2 | WEIGHT: 135 LBS | TEMPERATURE: 98.2 F | OXYGEN SATURATION: 100 % | DIASTOLIC BLOOD PRESSURE: 62 MMHG | HEART RATE: 85 BPM | RESPIRATION RATE: 18 BRPM | HEIGHT: 62 IN | SYSTOLIC BLOOD PRESSURE: 120 MMHG

## 2023-03-24 DIAGNOSIS — M62.838 SPASM OF MUSCLE: ICD-10-CM

## 2023-03-24 DIAGNOSIS — S09.90XA INJURY OF HEAD, INITIAL ENCOUNTER: ICD-10-CM

## 2023-03-24 DIAGNOSIS — S16.1XXA STRAIN OF NECK MUSCLE, INITIAL ENCOUNTER: ICD-10-CM

## 2023-03-24 DIAGNOSIS — M25.562 ACUTE PAIN OF LEFT KNEE: ICD-10-CM
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
## 2023-03-24 LAB
HCG, URINE, POC: NEGATIVE
Lab: NORMAL
NEGATIVE QC PASS/FAIL: NORMAL
POSITIVE QC PASS/FAIL: NORMAL

## 2023-03-24 PROCEDURE — 71046 X-RAY EXAM CHEST 2 VIEWS: CPT

## 2023-03-24 PROCEDURE — 73560 X-RAY EXAM OF KNEE 1 OR 2: CPT

## 2023-03-24 PROCEDURE — 70450 CT HEAD/BRAIN W/O DYE: CPT

## 2023-03-24 PROCEDURE — 99284 EMERGENCY DEPT VISIT MOD MDM: CPT

## 2023-03-24 PROCEDURE — 72125 CT NECK SPINE W/O DYE: CPT

## 2023-03-24 RX ORDER — IBUPROFEN 600 MG/1
600 TABLET ORAL EVERY 6 HOURS PRN
Qty: 20 TABLET | Refills: 0 | Status: SHIPPED | OUTPATIENT
Start: 2023-03-24

## 2023-03-24 RX ORDER — CYCLOBENZAPRINE HCL 10 MG
10 TABLET ORAL 3 TIMES DAILY PRN
Qty: 21 TABLET | Refills: 0 | Status: SHIPPED | OUTPATIENT
Start: 2023-03-24 | End: 2023-04-03

## 2023-03-24 ASSESSMENT — ENCOUNTER SYMPTOMS
EYE DISCHARGE: 0
BACK PAIN: 0
ANAL BLEEDING: 0
COUGH: 0
VOMITING: 0
ABDOMINAL DISTENTION: 0
NAUSEA: 0
APNEA: 0
VOICE CHANGE: 0

## 2023-03-24 ASSESSMENT — PAIN DESCRIPTION - PAIN TYPE: TYPE: ACUTE PAIN

## 2023-03-24 ASSESSMENT — PAIN - FUNCTIONAL ASSESSMENT
PAIN_FUNCTIONAL_ASSESSMENT: NONE - DENIES PAIN
PAIN_FUNCTIONAL_ASSESSMENT: 0-10

## 2023-03-24 ASSESSMENT — PAIN DESCRIPTION - DESCRIPTORS: DESCRIPTORS: ACHING

## 2023-03-24 ASSESSMENT — PAIN SCALES - GENERAL: PAINLEVEL_OUTOF10: 6

## 2023-03-24 NOTE — DISCHARGE INSTRUCTIONS
Follow-up with primary care physician. Return to if any symptoms worsen or new symptoms develop.   Do not drive or operate equipment taking medications including Flexeril as they may cause drowsiness

## 2023-03-24 NOTE — ED PROVIDER NOTES
Hematological:  Does not bruise/bleed easily. Psychiatric/Behavioral:  Negative for behavioral problems, self-injury and sleep disturbance. All other systems reviewed and are negative. Except as noted above the remainder of the review of systems was reviewed and negative. PAST MEDICAL HISTORY   History reviewed. No pertinent past medical history. SURGICALHISTORY     History reviewed. No pertinent surgical history. CURRENT MEDICATIONS       Previous Medications    METRONIDAZOLE (FLAGYL) 500 MG TABLET    TAKE 1 TABLET BY MOUTH TWICE DAILY FOR 7 DAYS            Patient has no known allergies. FAMILY HISTORY     History reviewed. No pertinent family history.        SOCIAL HISTORY       Social History     Socioeconomic History    Marital status: Single     Spouse name: None    Number of children: None    Years of education: None    Highest education level: None   Tobacco Use    Smoking status: Former     Types: Cigarettes     Quit date: 2022     Years since quittin.0    Smokeless tobacco: Never   Vaping Use    Vaping Use: Never used   Substance and Sexual Activity    Alcohol use: Never    Drug use: Never    Sexual activity: Never     Social Determinants of Health     Financial Resource Strain: Low Risk     Difficulty of Paying Living Expenses: Not hard at all   Food Insecurity: No Food Insecurity    Worried About [address] in the Last Year: Never true    [address] [facility] [address] in the Last Year: Never true       SCREENINGS   [place] Coma Scale  Eye Opening: Spontaneous  Best Verbal Response: Oriented  Best Motor Response: Obeys commands  [place] Coma Scale Score: 15  Ebola Virus Disease (EVD) Screening   Temp: 98.2 °F (36.8 °C)  CIWA Assessment  BP: 120/62  Heart Rate: 85    PHYSICAL EXAM    (up to 7 for level 4, 8 or more for level 5)     ED Triage Vitals [23 1408]   BP Temp Temp Source Heart Rate Resp SpO2 Height Weight   120/62 98.2 °F (36.8 °C) Oral 85 18 100 % 5' 2\" (1.575 m) 135 lb (61.2 kg)       Physical Exam  Vitals and nursing note reviewed. Constitutional:       General: She is not in acute distress. Appearance: She is well-developed. HENT:      Head: Normocephalic and atraumatic. Right Ear: Tympanic membrane and external ear normal.      Left Ear: Tympanic membrane and external ear normal.      Nose: Nose normal.      Mouth/Throat:      Mouth: Mucous membranes are moist.   Eyes:      General:         Right eye: No discharge. Left eye: No discharge. Pupils: Pupils are equal, round, and reactive to light. Neck:     Cardiovascular:      Rate and Rhythm: Normal rate and regular rhythm. Heart sounds: Normal heart sounds. Pulmonary:      Effort: Pulmonary effort is normal. No respiratory distress. Breath sounds: Normal breath sounds. No stridor. Abdominal:      General: Bowel sounds are normal. There is no distension. Palpations: Abdomen is soft. Tenderness: There is no abdominal tenderness. There is no right CVA tenderness or left CVA tenderness. Musculoskeletal:         General: Tenderness present. Normal range of motion. Arms:       Cervical back: Normal range of motion and neck supple. Tenderness present. Comments: Negative T, L tenderness negative. Paraspinal tenderness    Lt Positive tenderness patellar tendon superiorly with slight tenderness overlying patella   Skin:     General: Skin is warm. Findings: No erythema. Neurological:      Mental Status: She is alert and oriented to person, place, and time.    Psychiatric:         Mood and Affect: Mood normal.       RESULTS     EKG: All EKG's are interpreted by the Emergency Department Physician who either signs or Co-signsthis chart in the absence of a cardiologist.         RADIOLOGY:   Non-plain filmimages such as CT, Ultrasound and MRI are read by the radiologist. Plain radiographic images are visualized and preliminarily interpreted by the emergency

## 2023-03-24 NOTE — Clinical Note
Don Naranjo was seen and treated in our emergency department on 3/24/2023. She may return to school on 03/30/2023. If you have any questions or concerns, please don't hesitate to call.       Richi Johnson PA-C

## 2023-06-16 DIAGNOSIS — Z11.3 SCREEN FOR STD (SEXUALLY TRANSMITTED DISEASE): ICD-10-CM

## 2023-06-16 DIAGNOSIS — Z78.9 HISTORY OF HEPATITIS B VACCINATION UNKNOWN: ICD-10-CM

## 2023-06-17 LAB
HBV SURFACE AB TITR SER: >1000 MIU/ML
HEPATITIS C ANTIBODY: NONREACTIVE
HIV AG/AB: NONREACTIVE

## 2023-06-18 LAB
HAV AB SER QL IA: POSITIVE
RPR SER QL: NORMAL

## 2023-09-15 ENCOUNTER — OFFICE VISIT (OUTPATIENT)
Dept: FAMILY MEDICINE CLINIC | Age: 23
End: 2023-09-15
Payer: OTHER GOVERNMENT

## 2023-09-15 VITALS
TEMPERATURE: 97 F | HEART RATE: 74 BPM | RESPIRATION RATE: 12 BRPM | BODY MASS INDEX: 27.86 KG/M2 | OXYGEN SATURATION: 99 % | SYSTOLIC BLOOD PRESSURE: 108 MMHG | DIASTOLIC BLOOD PRESSURE: 62 MMHG | HEIGHT: 62 IN | WEIGHT: 151.38 LBS

## 2023-09-15 DIAGNOSIS — N76.0 BACTERIAL VAGINOSIS: Primary | ICD-10-CM

## 2023-09-15 DIAGNOSIS — B96.89 BACTERIAL VAGINOSIS: Primary | ICD-10-CM

## 2023-09-15 DIAGNOSIS — N39.41 URGENCY INCONTINENCE: ICD-10-CM

## 2023-09-15 PROCEDURE — 99213 OFFICE O/P EST LOW 20 MIN: CPT | Performed by: NURSE PRACTITIONER

## 2023-09-15 PROCEDURE — 81003 URINALYSIS AUTO W/O SCOPE: CPT | Performed by: NURSE PRACTITIONER

## 2023-09-15 RX ORDER — METRONIDAZOLE 500 MG/1
500 TABLET ORAL 2 TIMES DAILY
Qty: 14 TABLET | Refills: 0 | Status: SHIPPED | OUTPATIENT
Start: 2023-09-15 | End: 2023-09-22

## 2023-09-15 SDOH — ECONOMIC STABILITY: FOOD INSECURITY: WITHIN THE PAST 12 MONTHS, THE FOOD YOU BOUGHT JUST DIDN'T LAST AND YOU DIDN'T HAVE MONEY TO GET MORE.: NEVER TRUE

## 2023-09-15 SDOH — ECONOMIC STABILITY: HOUSING INSECURITY
IN THE LAST 12 MONTHS, WAS THERE A TIME WHEN YOU DID NOT HAVE A STEADY PLACE TO SLEEP OR SLEPT IN A SHELTER (INCLUDING NOW)?: NO

## 2023-09-15 SDOH — ECONOMIC STABILITY: FOOD INSECURITY: WITHIN THE PAST 12 MONTHS, YOU WORRIED THAT YOUR FOOD WOULD RUN OUT BEFORE YOU GOT MONEY TO BUY MORE.: NEVER TRUE

## 2023-09-15 SDOH — ECONOMIC STABILITY: INCOME INSECURITY: HOW HARD IS IT FOR YOU TO PAY FOR THE VERY BASICS LIKE FOOD, HOUSING, MEDICAL CARE, AND HEATING?: NOT HARD AT ALL

## 2023-09-15 ASSESSMENT — ENCOUNTER SYMPTOMS
NAUSEA: 0
DIARRHEA: 0
ABDOMINAL PAIN: 0

## 2023-09-15 ASSESSMENT — PATIENT HEALTH QUESTIONNAIRE - PHQ9
SUM OF ALL RESPONSES TO PHQ QUESTIONS 1-9: 0
1. LITTLE INTEREST OR PLEASURE IN DOING THINGS: 0
SUM OF ALL RESPONSES TO PHQ QUESTIONS 1-9: 0

## 2023-09-17 LAB — BACTERIA UR CULT: NORMAL

## 2023-10-05 DIAGNOSIS — N76.0 BACTERIAL VAGINOSIS: ICD-10-CM

## 2023-10-05 DIAGNOSIS — B96.89 BACTERIAL VAGINOSIS: ICD-10-CM

## 2023-10-05 RX ORDER — METRONIDAZOLE 500 MG/1
500 TABLET ORAL 2 TIMES DAILY
Qty: 14 TABLET | Refills: 0 | OUTPATIENT
Start: 2023-10-05 | End: 2023-10-12

## 2023-10-17 ENCOUNTER — OFFICE VISIT (OUTPATIENT)
Dept: FAMILY MEDICINE CLINIC | Age: 23
End: 2023-10-17
Payer: OTHER GOVERNMENT

## 2023-10-17 VITALS
SYSTOLIC BLOOD PRESSURE: 123 MMHG | BODY MASS INDEX: 27.25 KG/M2 | TEMPERATURE: 98.2 F | DIASTOLIC BLOOD PRESSURE: 70 MMHG | OXYGEN SATURATION: 100 % | HEART RATE: 64 BPM | WEIGHT: 149 LBS

## 2023-10-17 DIAGNOSIS — Z11.3 SCREENING EXAMINATION FOR STD (SEXUALLY TRANSMITTED DISEASE): Primary | ICD-10-CM

## 2023-10-17 PROCEDURE — 81025 URINE PREGNANCY TEST: CPT | Performed by: PHYSICIAN ASSISTANT

## 2023-10-17 PROCEDURE — 99213 OFFICE O/P EST LOW 20 MIN: CPT | Performed by: PHYSICIAN ASSISTANT

## 2023-10-17 ASSESSMENT — ENCOUNTER SYMPTOMS
EYES NEGATIVE: 1
GASTROINTESTINAL NEGATIVE: 1
RESPIRATORY NEGATIVE: 1

## 2023-11-01 ENCOUNTER — E-VISIT (OUTPATIENT)
Dept: PRIMARY CARE CLINIC | Age: 23
End: 2023-11-01
Payer: OTHER GOVERNMENT

## 2023-11-01 DIAGNOSIS — N89.8 VAGINAL DISCHARGE: Primary | ICD-10-CM

## 2023-11-01 PROCEDURE — 99422 OL DIG E/M SVC 11-20 MIN: CPT | Performed by: NURSE PRACTITIONER

## 2023-11-01 NOTE — PROGRESS NOTES
Lissy Aquino (2000) initiated an asynchronous digital communication through [address] [place] [address]. HPI: per patient questionnaire     Exam: not applicable    Diagnoses and all orders for this visit:  Diagnoses and all orders for this visit:    Vaginal discharge    Patient complains of a brown discharge and concern of a vaginal tear. Recommend being seen by PCP or GYN    Time: EV2 - 11-20 minutes were spent on the digital evaluation and management of this patient.  16 min     DIMITRIOS Ward - CNP

## 2024-01-16 ENCOUNTER — OFFICE VISIT (OUTPATIENT)
Dept: FAMILY MEDICINE CLINIC | Age: 24
End: 2024-01-16
Payer: OTHER GOVERNMENT

## 2024-01-16 VITALS
BODY MASS INDEX: 27.49 KG/M2 | RESPIRATION RATE: 16 BRPM | TEMPERATURE: 98.5 F | HEIGHT: 61 IN | DIASTOLIC BLOOD PRESSURE: 60 MMHG | OXYGEN SATURATION: 98 % | SYSTOLIC BLOOD PRESSURE: 110 MMHG | WEIGHT: 145.6 LBS | HEART RATE: 70 BPM

## 2024-01-16 DIAGNOSIS — N89.8 VAGINAL DISCHARGE: ICD-10-CM

## 2024-01-16 DIAGNOSIS — R21 PERIANAL RASH: ICD-10-CM

## 2024-01-16 DIAGNOSIS — R10.2 PELVIC PAIN: ICD-10-CM

## 2024-01-16 DIAGNOSIS — N76.0 BV (BACTERIAL VAGINOSIS): Primary | ICD-10-CM

## 2024-01-16 DIAGNOSIS — B96.89 BV (BACTERIAL VAGINOSIS): Primary | ICD-10-CM

## 2024-01-16 LAB
BILIRUBIN, POC: ABNORMAL
BLOOD URINE, POC: ABNORMAL
CLARITY, POC: ABNORMAL
COLOR, POC: YELLOW
GLUCOSE URINE, POC: ABNORMAL
KETONES, POC: ABNORMAL
LEUKOCYTE EST, POC: ABNORMAL
NITRITE, POC: ABNORMAL
PH, POC: 6
PROTEIN, POC: 0.15
SPECIFIC GRAVITY, POC: 1.02
UROBILINOGEN, POC: 3.5

## 2024-01-16 PROCEDURE — 99213 OFFICE O/P EST LOW 20 MIN: CPT | Performed by: NURSE PRACTITIONER

## 2024-01-16 PROCEDURE — 81003 URINALYSIS AUTO W/O SCOPE: CPT | Performed by: NURSE PRACTITIONER

## 2024-01-16 RX ORDER — METRONIDAZOLE 500 MG/1
500 TABLET ORAL 2 TIMES DAILY
Qty: 14 TABLET | Refills: 0 | Status: SHIPPED | OUTPATIENT
Start: 2024-01-16 | End: 2024-01-23

## 2024-01-16 ASSESSMENT — PATIENT HEALTH QUESTIONNAIRE - PHQ9
SUM OF ALL RESPONSES TO PHQ QUESTIONS 1-9: 0
2. FEELING DOWN, DEPRESSED OR HOPELESS: 0
SUM OF ALL RESPONSES TO PHQ9 QUESTIONS 1 & 2: 0
1. LITTLE INTEREST OR PLEASURE IN DOING THINGS: 0
SUM OF ALL RESPONSES TO PHQ QUESTIONS 1-9: 0

## 2024-01-16 NOTE — PROGRESS NOTES
Subjective  Micaela Lopez, 23 y.o. female presents today with:  Chief Complaint   Patient presents with    Urinary Tract Infection     Discharge and smell x 1 wk was taking Azo but didn't work        HPI  Presents to  for vaginal discharge   Increased vaginal discharge, Clear   Reports history of BV. She would like STI screening.    C/o pelvic discomfort   Denies hematuria   Denies dysuria   Denies flank pain   Denies fever or chills   Eating and drinking   Sleep unchanged                 History reviewed. No pertinent past medical history.   History reviewed. No pertinent surgical history.  History reviewed. No pertinent family history.          Review of Systems   Constitutional:  Negative for activity change, appetite change, chills, diaphoresis, fatigue and fever.   Genitourinary:  Negative for difficulty urinating.   Neurological:  Negative for dizziness, light-headedness and headaches.         PMH, Surgical Hx, Family Hx, and Social Hx reviewed and updated.  Health Maintenance reviewed.          Objective  Vitals:    01/16/24 1113   BP: 110/60   Site: Left Upper Arm   Position: Sitting   Cuff Size: Large Adult   Pulse: 70   Resp: 16   Temp: 98.5 °F (36.9 °C)   SpO2: 98%   Weight: 66 kg (145 lb 9.6 oz)   Height: 1.549 m (5' 1\")     BP Readings from Last 3 Encounters:   01/16/24 110/60   10/17/23 123/70   09/15/23 108/62     Wt Readings from Last 3 Encounters:   01/16/24 66 kg (145 lb 9.6 oz)   10/17/23 67.6 kg (149 lb)   09/15/23 68.7 kg (151 lb 6 oz)           Physical Exam  Vitals reviewed.   Constitutional:       General: She is awake. She is not in acute distress.     Appearance: She is not toxic-appearing.   HENT:      Right Ear: External ear normal.      Left Ear: External ear normal.      Nose: Nose normal.      Mouth/Throat:      Lips: Pink.   Eyes:      General: Lids are normal.      Conjunctiva/sclera: Conjunctivae normal.   Cardiovascular:      Rate and Rhythm: Normal rate.   Pulmonary:

## 2024-01-17 LAB
BACTERIA UR CULT: NORMAL
CLUE CELLS VAG QL WET PREP: ABNORMAL
T VAGINALIS VAG QL WET PREP: ABNORMAL
TRICHOMONAS VAGINALIS SCREEN: NEGATIVE
YEAST VAG QL WET PREP: ABNORMAL

## 2024-01-19 LAB
C TRACH DNA UR QL NAA+PROBE: NEGATIVE
N GONORRHOEA DNA UR QL NAA+PROBE: NEGATIVE

## 2024-01-24 ENCOUNTER — HOSPITAL ENCOUNTER (EMERGENCY)
Age: 24
Discharge: HOME | End: 2024-01-24
Payer: OTHER GOVERNMENT

## 2024-01-24 VITALS
BODY MASS INDEX: 27.4 KG/M2 | SYSTOLIC BLOOD PRESSURE: 135 MMHG | HEART RATE: 77 BPM | DIASTOLIC BLOOD PRESSURE: 68 MMHG | WEIGHT: 145 LBS | TEMPERATURE: 98.1 F | RESPIRATION RATE: 19 BRPM | OXYGEN SATURATION: 98 %

## 2024-01-24 DIAGNOSIS — B37.41 YEAST CYSTITIS: Primary | ICD-10-CM

## 2024-01-24 PROCEDURE — 99283 EMERGENCY DEPT VISIT LOW MDM: CPT

## 2024-01-24 PROCEDURE — 6370000000 HC RX 637 (ALT 250 FOR IP)

## 2024-01-24 RX ORDER — FLUCONAZOLE 100 MG/1
200 TABLET ORAL ONCE
Status: COMPLETED | OUTPATIENT
Start: 2024-01-24 | End: 2024-01-24

## 2024-01-24 RX ADMIN — FLUCONAZOLE 200 MG: 100 TABLET ORAL at 04:45

## 2024-01-24 ASSESSMENT — PAIN DESCRIPTION - ONSET: ONSET: ON-GOING

## 2024-01-24 ASSESSMENT — PAIN DESCRIPTION - FREQUENCY: FREQUENCY: CONTINUOUS

## 2024-01-24 ASSESSMENT — PAIN DESCRIPTION - LOCATION: LOCATION: VAGINA

## 2024-01-24 ASSESSMENT — PAIN - FUNCTIONAL ASSESSMENT: PAIN_FUNCTIONAL_ASSESSMENT: NONE - DENIES PAIN

## 2024-01-24 ASSESSMENT — ENCOUNTER SYMPTOMS
NAUSEA: 0
DIARRHEA: 0
ABDOMINAL PAIN: 0
VOMITING: 0
PHOTOPHOBIA: 0
SHORTNESS OF BREATH: 0
COUGH: 0

## 2024-01-24 ASSESSMENT — PAIN DESCRIPTION - DESCRIPTORS: DESCRIPTORS: BURNING

## 2024-01-24 ASSESSMENT — PAIN SCALES - GENERAL: PAINLEVEL_OUTOF10: 5

## 2024-01-24 NOTE — ED PROVIDER NOTES
[facility] ED  EMERGENCY DEPARTMENT ENCOUNTER      Pt Name: Micaela Lopez  MRN: [id number]  Birthdate 2000  Date of evaluation: 1/24/2024  Provider: PRISCA Frausto  2:56 AM EST      CHIEF COMPLAINT       Chief Complaint   Patient presents with    OTHER     Lesion and burning in vagina         HISTORY OF PRESENT ILLNESS   (Location/Symptom, Timing/Onset, Context/Setting, Quality, Duration, Modifying Factors, Severity)  Note limiting factors.   Micaela Lopez is a 23 y.o. female who presents to the emergency department PMHx BV. Patient presents to the ED due to concern for vaginal itching and irritation, states raised lesions to bilateral vulva that she would like evaluated.  Patient was recently seen in urgent care setting and diagnosed with BV.  2+ clue cells on testing.  She was given Flagyl and states she completed the entire course.  It was after starting Flagyl that these other symptoms started.  Notably patient was also negative for trichomonas, chlamydia, gonorrhea at that time and she had a urinalysis that was noninfectious.  Patient denies fever, chills, nausea, vomiting, pelvic pain, abdominal pain, flank pain, dysuria, hematuria, urinary urgency, urinary frequency, abnormal vaginal bleeding or discharge. Denies any specific new STI exposures.       HPI    Nursing Notes were reviewed.    REVIEW OF SYSTEMS    (2-9 systems for level 4, 10 or more for level 5)     Review of Systems   Constitutional:  Negative for chills and fever.   HENT:  Negative for congestion.    Eyes:  Negative for photophobia.   Respiratory:  Negative for cough and shortness of breath.    Cardiovascular:  Negative for chest pain.   Gastrointestinal:  Negative for abdominal pain, diarrhea, nausea and vomiting.   Genitourinary:  Negative for decreased urine volume, difficulty urinating, dysuria, flank pain, hematuria, urgency, vaginal bleeding and vaginal discharge.        Vaginal itching and irritation.

## 2024-02-28 ENCOUNTER — OFFICE VISIT (OUTPATIENT)
Dept: FAMILY MEDICINE CLINIC | Age: 24
End: 2024-02-28
Payer: OTHER GOVERNMENT

## 2024-02-28 VITALS
TEMPERATURE: 97.5 F | WEIGHT: 145 LBS | DIASTOLIC BLOOD PRESSURE: 72 MMHG | HEART RATE: 75 BPM | OXYGEN SATURATION: 99 % | SYSTOLIC BLOOD PRESSURE: 114 MMHG | BODY MASS INDEX: 27.38 KG/M2 | HEIGHT: 61 IN

## 2024-02-28 DIAGNOSIS — N76.0 ACUTE VAGINITIS: ICD-10-CM

## 2024-02-28 DIAGNOSIS — R30.0 DYSURIA: ICD-10-CM

## 2024-02-28 DIAGNOSIS — L98.9 SKIN LESION: ICD-10-CM

## 2024-02-28 DIAGNOSIS — N30.00 ACUTE CYSTITIS WITHOUT HEMATURIA: Primary | ICD-10-CM

## 2024-02-28 LAB
BILIRUBIN, POC: ABNORMAL
BLOOD URINE, POC: ABNORMAL
CLARITY, POC: ABNORMAL
COLOR, POC: YELLOW
CONTROL: NORMAL
GLUCOSE URINE, POC: ABNORMAL
KETONES, POC: ABNORMAL
LEUKOCYTE EST, POC: ABNORMAL
NITRITE, POC: ABNORMAL
PH, POC: 6
PREGNANCY TEST URINE, POC: NEGATIVE
PROTEIN, POC: ABNORMAL
SPECIFIC GRAVITY, POC: 1.03
UROBILINOGEN, POC: ABNORMAL

## 2024-02-28 PROCEDURE — 81025 URINE PREGNANCY TEST: CPT | Performed by: NURSE PRACTITIONER

## 2024-02-28 PROCEDURE — 81003 URINALYSIS AUTO W/O SCOPE: CPT | Performed by: NURSE PRACTITIONER

## 2024-02-28 PROCEDURE — 99213 OFFICE O/P EST LOW 20 MIN: CPT | Performed by: NURSE PRACTITIONER

## 2024-02-28 RX ORDER — METRONIDAZOLE 500 MG/1
500 TABLET ORAL 2 TIMES DAILY
Qty: 14 TABLET | Refills: 0 | Status: SHIPPED | OUTPATIENT
Start: 2024-02-28 | End: 2024-03-06

## 2024-02-28 RX ORDER — NITROFURANTOIN 25; 75 MG/1; MG/1
100 CAPSULE ORAL 2 TIMES DAILY
Qty: 10 CAPSULE | Refills: 0 | Status: SHIPPED | OUTPATIENT
Start: 2024-02-28 | End: 2024-03-04

## 2024-02-28 RX ORDER — FLUCONAZOLE 150 MG/1
150 TABLET ORAL ONCE
Qty: 1 TABLET | Refills: 0 | Status: SHIPPED | OUTPATIENT
Start: 2024-02-28 | End: 2024-02-28

## 2024-02-28 ASSESSMENT — ENCOUNTER SYMPTOMS
NAUSEA: 0
VOMITING: 0
ABDOMINAL DISTENTION: 0
COLOR CHANGE: 0

## 2024-02-28 NOTE — PROGRESS NOTES
Subjective:      Patient ID: Micaela Lopez is a 23 y.o. female who presents today for:  Chief Complaint   Patient presents with    Urinary Tract Infection     Pt states urinary discomfort for about 2 days       HPI  Patient is here with urinary sx for the last 2 days, burning with urination. Says she has some burning in the area.   Says she has some burning at the urethra.  Says she has more frequency and urgency.  Says she has pressure over the bladder are.  Says she has lower back pain.   Says she has odor to the area.   Say she has discharge as well, sometimes clear and sometimes.  Says she is due for her menstral cycle next week and she has regular cycles.   She is unsure about pregnancy.   Says she would like urine sent for UTI.   History reviewed. No pertinent past medical history.  History reviewed. No pertinent surgical history.  Social History     Socioeconomic History    Marital status: Single     Spouse name: Not on file    Number of children: Not on file    Years of education: Not on file    Highest education level: Not on file   Occupational History    Not on file   Tobacco Use    Smoking status: Former     Types: E-Cigarettes     Passive exposure: Current    Smokeless tobacco: Never   Vaping Use    Vaping Use: Never used   Substance and Sexual Activity    Alcohol use: Never    Drug use: Never    Sexual activity: Yes     Partners: Male   Other Topics Concern    Not on file   Social History Narrative    Not on file     Social Determinants of Health     Financial Resource Strain: Low Risk  (9/15/2023)    Overall Financial Resource Strain (CARDIA)     Difficulty of Paying Living Expenses: Not hard at all   Food Insecurity: Not on file (9/15/2023)   Transportation Needs: Unknown (9/15/2023)    PRAPARE - Transportation     Lack of Transportation (Medical): Not on file     Lack of Transportation (Non-Medical): No   Physical Activity: Not on file   Stress: Not on file   Social Connections: Not on file

## 2024-02-29 LAB — BACTERIA UR CULT: NORMAL

## 2024-03-01 LAB
SPECIMEN SOURCE: NORMAL
T VAGINALIS RRNA SPEC QL NAA+PROBE: NEGATIVE

## 2024-03-05 LAB
C TRACH DNA CVX QL NAA+PROBE: NEGATIVE
N GONORRHOEA DNA CERV MUCUS QL NAA+PROBE: NEGATIVE

## 2024-03-14 ENCOUNTER — OFFICE VISIT (OUTPATIENT)
Dept: OBGYN CLINIC | Age: 24
End: 2024-03-14
Payer: OTHER GOVERNMENT

## 2024-03-14 VITALS — SYSTOLIC BLOOD PRESSURE: 100 MMHG | DIASTOLIC BLOOD PRESSURE: 64 MMHG | WEIGHT: 145 LBS | BODY MASS INDEX: 27.41 KG/M2

## 2024-03-14 DIAGNOSIS — N89.8 VAGINAL DISCHARGE: ICD-10-CM

## 2024-03-14 DIAGNOSIS — B37.31 RECURRENT CANDIDIASIS OF VAGINA: ICD-10-CM

## 2024-03-14 DIAGNOSIS — N89.8 VAGINAL IRRITATION: ICD-10-CM

## 2024-03-14 DIAGNOSIS — N94.6 DYSMENORRHEA: ICD-10-CM

## 2024-03-14 DIAGNOSIS — Z11.51 SCREENING FOR HUMAN PAPILLOMAVIRUS: ICD-10-CM

## 2024-03-14 DIAGNOSIS — Z01.419 WOMEN'S ANNUAL ROUTINE GYNECOLOGICAL EXAMINATION: Primary | ICD-10-CM

## 2024-03-14 DIAGNOSIS — Z30.013 ENCOUNTER FOR INITIAL PRESCRIPTION OF INJECTABLE CONTRACEPTIVE: ICD-10-CM

## 2024-03-14 DIAGNOSIS — N76.0 RECURRENT VAGINITIS: ICD-10-CM

## 2024-03-14 PROCEDURE — 99385 PREV VISIT NEW AGE 18-39: CPT | Performed by: ADVANCED PRACTICE MIDWIFE

## 2024-03-14 PROCEDURE — 99204 OFFICE O/P NEW MOD 45 MIN: CPT | Performed by: ADVANCED PRACTICE MIDWIFE

## 2024-03-14 RX ORDER — MEDROXYPROGESTERONE ACETATE 150 MG/ML
150 INJECTION, SUSPENSION INTRAMUSCULAR
Qty: 1 ML | Refills: 3 | Status: SHIPPED | OUTPATIENT
Start: 2024-03-14 | End: 2025-03-14

## 2024-03-14 RX ORDER — FLUCONAZOLE 150 MG/1
TABLET ORAL
Qty: 30 TABLET | Refills: 0 | Status: SHIPPED | OUTPATIENT
Start: 2024-03-14 | End: 2024-09-10

## 2024-03-14 RX ORDER — METRONIDAZOLE 7.5 MG/G
GEL VAGINAL
Qty: 70 G | Refills: 2 | Status: SHIPPED | OUTPATIENT
Start: 2024-03-14 | End: 2024-09-15

## 2024-03-14 ASSESSMENT — ENCOUNTER SYMPTOMS
NAUSEA: 0
COUGH: 0
TROUBLE SWALLOWING: 0
VOMITING: 0
VOICE CHANGE: 0
SORE THROAT: 0
RHINORRHEA: 0
DIARRHEA: 0
CONSTIPATION: 0
ABDOMINAL PAIN: 0
SHORTNESS OF BREATH: 0

## 2024-03-14 NOTE — PROGRESS NOTES
Chief Complaint:     Micaela Lopez is a 24 y.o. female who presents here today for complaints of:      Chief Complaint   Patient presents with    Vaginitis     Pt c/o experiencing yeast/bv after intercourse almost every time     History of Present Illness:     Micaela Lopez is a 24 y.o. female who presents for her annual exam.      Recurrent Vaginitis  Each time she has intercourse she immediately develops increased vaginal discharge and odor that does not resolve without antibiotic therapy.  Once antibiotics are taken, she then develops vaginal itching and irritation.    This cycle repeats each time she has intercourse despite use of condoms    Dysmenorrhea  Wishes to initiate a hormonal contraceptive method to relieve uncomfortable menstrual symptoms.    Dysmenorrhea symptoms have been occurring for greater than 1 year.  Following a discussion of expected changes in menstrual bleeding, possible side effects, non-contraceptive benefits, and the effect on future fertility, wishes to initiate Depo Provera.    Past Medical History:     Allergies:  Patient has no known allergies.  No LMP recorded.  Obstetrical History:  No obstetric history on file.     No past medical history on file.  Medications:     Current Outpatient Medications on File Prior to Visit   Medication Sig Dispense Refill    miconazole (MICOTIN) 2 % cream Apply topically 2 times daily for 14 days (Patient not taking: Reported on 3/14/2024) 92 g 1     No current facility-administered medications on file prior to visit.     Review of Systems:     Review of Systems   Constitutional:  Negative for activity change, appetite change, chills, diaphoresis, fatigue, fever and unexpected weight change.   HENT:  Negative for congestion, postnasal drip, rhinorrhea, sneezing, sore throat, trouble swallowing and voice change.    Respiratory:  Negative for cough and shortness of breath.    Cardiovascular:  Negative for chest pain.   Gastrointestinal:  Negative for

## 2024-03-15 LAB
CLUE CELLS VAG QL WET PREP: NORMAL
T VAGINALIS VAG QL WET PREP: NORMAL
TRICHOMONAS VAGINALIS SCREEN: NEGATIVE
YEAST VAG QL WET PREP: NORMAL

## 2024-03-19 LAB
C TRACH DNA CVX QL NAA+PROBE: NEGATIVE
N GONORRHOEA DNA SPEC QL NAA+PROBE: NEGATIVE

## 2024-03-21 LAB
HPV HR 12 DNA SPEC QL NAA+PROBE: DETECTED
HPV16 DNA SPEC QL NAA+PROBE: NOT DETECTED
HPV16+18+H RISK 12 DNA SPEC-IMP: ABNORMAL
HPV18 DNA SPEC QL NAA+PROBE: NOT DETECTED

## 2024-03-25 ENCOUNTER — TELEPHONE (OUTPATIENT)
Dept: OBGYN CLINIC | Age: 24
End: 2024-03-25

## 2024-03-25 NOTE — TELEPHONE ENCOUNTER
Patient called and is very concerned with the HPV results as her grandmother had cervical cancer. Would like to talk to you about this. Was informed you're not in the office until Wednesday..

## 2024-03-29 DIAGNOSIS — Z23 NEED FOR VACCINATION: ICD-10-CM

## 2024-06-07 ENCOUNTER — E-VISIT (OUTPATIENT)
Dept: PRIMARY CARE CLINIC | Age: 24
End: 2024-06-07
Payer: OTHER GOVERNMENT

## 2024-06-07 DIAGNOSIS — N76.0 ACUTE VAGINITIS: Primary | ICD-10-CM

## 2024-06-07 PROCEDURE — 99422 OL DIG E/M SVC 11-20 MIN: CPT | Performed by: NURSE PRACTITIONER

## 2024-06-07 NOTE — PROGRESS NOTES
Micaela Lopez (2000) initiated an asynchronous digital communication through [employer].    HPI: per patient questionnaire     Exam: not applicable    Diagnoses and all orders for this visit:  Diagnoses and all orders for this visit:    Acute vaginitis      C/o vaginitis. She has a frequent hx of vaginitis. Message sent to pt to clarify treatment. No response     Time: EV2 - 11-20 minutes were spent on the digital evaluation and management of this patient. 18 min     DIMITRIOS Martínez - CNP

## 2024-06-12 ENCOUNTER — TELEPHONE (OUTPATIENT)
Dept: PRIMARY CARE CLINIC | Age: 24
End: 2024-06-12

## 2024-06-12 RX ORDER — METRONIDAZOLE 500 MG/1
500 TABLET ORAL 2 TIMES DAILY
Qty: 14 TABLET | Refills: 0 | Status: SHIPPED | OUTPATIENT
Start: 2024-06-12 | End: 2024-06-19

## 2024-07-24 ENCOUNTER — OFFICE VISIT (OUTPATIENT)
Dept: OBGYN CLINIC | Age: 24
End: 2024-07-24
Payer: OTHER GOVERNMENT

## 2024-07-24 VITALS
BODY MASS INDEX: 28.36 KG/M2 | SYSTOLIC BLOOD PRESSURE: 106 MMHG | HEART RATE: 73 BPM | WEIGHT: 150 LBS | DIASTOLIC BLOOD PRESSURE: 64 MMHG

## 2024-07-24 DIAGNOSIS — Z32.02 URINE PREGNANCY TEST NEGATIVE: ICD-10-CM

## 2024-07-24 DIAGNOSIS — Z30.42 SURVEILLANCE FOR DEPO-PROVERA CONTRACEPTION: ICD-10-CM

## 2024-07-24 DIAGNOSIS — Z30.014 ENCOUNTER FOR INITIAL PRESCRIPTION OF INTRAUTERINE CONTRACEPTIVE DEVICE (IUD): ICD-10-CM

## 2024-07-24 DIAGNOSIS — N94.6 DYSMENORRHEA: Primary | ICD-10-CM

## 2024-07-24 PROCEDURE — 99214 OFFICE O/P EST MOD 30 MIN: CPT | Performed by: ADVANCED PRACTICE MIDWIFE

## 2024-07-24 PROCEDURE — 81025 URINE PREGNANCY TEST: CPT | Performed by: ADVANCED PRACTICE MIDWIFE

## 2024-07-24 RX ORDER — MISOPROSTOL 200 UG/1
TABLET ORAL
Qty: 4 TABLET | Refills: 0 | Status: SHIPPED | OUTPATIENT
Start: 2024-07-24

## 2024-07-24 RX ORDER — IBUPROFEN 800 MG/1
TABLET ORAL
Qty: 15 TABLET | Refills: 0 | Status: SHIPPED | OUTPATIENT
Start: 2024-07-24

## 2024-07-24 RX ORDER — MEDROXYPROGESTERONE ACETATE 150 MG/ML
150 INJECTION, SUSPENSION INTRAMUSCULAR
Qty: 1 ML | Refills: 3 | Status: SHIPPED | OUTPATIENT
Start: 2024-07-24 | End: 2025-07-24

## 2024-07-24 ASSESSMENT — ENCOUNTER SYMPTOMS
NAUSEA: 0
DIARRHEA: 0
SHORTNESS OF BREATH: 0
COUGH: 0
CONSTIPATION: 0
ABDOMINAL PAIN: 0
VOMITING: 0

## 2024-07-24 NOTE — PROGRESS NOTES
contraception to relieve uncomfortable menstrual symptoms.    Tere IUD, Initial Prescription  Rx for Cytotec and Ibuprofen  RTC for Tere IUD placement    Depo Provera Surveillance  Continue with Depo Provera today to control dysmenorrhea symptoms until an IUD can be placed  UPT - Negative    Return if symptoms worsen or fail to improve, for IUD Placement.    Mayte Trent, DIMITRIOS - BRUCEM

## 2025-04-17 ENCOUNTER — OFFICE VISIT (OUTPATIENT)
Dept: OBGYN CLINIC | Age: 25
End: 2025-04-17
Payer: OTHER GOVERNMENT

## 2025-04-17 VITALS
BODY MASS INDEX: 27.68 KG/M2 | WEIGHT: 141 LBS | SYSTOLIC BLOOD PRESSURE: 126 MMHG | HEIGHT: 60 IN | HEART RATE: 71 BPM | DIASTOLIC BLOOD PRESSURE: 72 MMHG

## 2025-04-17 DIAGNOSIS — Z11.51 SPECIAL SCREENING EXAMINATION FOR HUMAN PAPILLOMAVIRUS (HPV): ICD-10-CM

## 2025-04-17 DIAGNOSIS — Z72.51 UNPROTECTED SEXUAL INTERCOURSE: ICD-10-CM

## 2025-04-17 DIAGNOSIS — Z01.419 WOMEN'S ANNUAL ROUTINE GYNECOLOGICAL EXAMINATION: Primary | ICD-10-CM

## 2025-04-17 LAB — HPV16+18+H RISK 12 DNA SPEC-IMP: NORMAL

## 2025-04-17 PROCEDURE — 99395 PREV VISIT EST AGE 18-39: CPT | Performed by: ADVANCED PRACTICE MIDWIFE

## 2025-04-17 ASSESSMENT — ENCOUNTER SYMPTOMS
SHORTNESS OF BREATH: 0
DIARRHEA: 0
TROUBLE SWALLOWING: 0
SORE THROAT: 0
NAUSEA: 0
VOICE CHANGE: 0
RHINORRHEA: 0
COUGH: 0
ABDOMINAL PAIN: 0
CONSTIPATION: 0
VOMITING: 0

## 2025-04-17 NOTE — PROGRESS NOTES
Chief Complaint:     Micaela Lopez is a 25 y.o. female who presents here today for:      Chief Complaint   Patient presents with    Annual Exam     Last pap 3-14-24 ASCUS HPVOH      History of Present Illness:     Micaela Lopez is a 25 y.o. female who presents for her annual exam.      Bacterial Vaginosis  Each month in the days following her period she develops increased discharge and odor.  Symptoms last for about 5 days before resolving.  Discussed that this may be normal hormonal changes with her period, or it may be recurrent bacterial vaginosis.  Encouraged her to have cultures done following her next period to guide if this is her \"normal\" or if treatment is needed.    Past Medical History:     Allergies:  Latex  No LMP recorded.  Obstetrical History:  No obstetric history on file.     No past medical history on file.  Medications:     No current outpatient medications on file prior to visit.     No current facility-administered medications on file prior to visit.     Review of Systems:     Review of Systems   Constitutional:  Negative for activity change, appetite change, chills, diaphoresis, fatigue, fever and unexpected weight change.   HENT:  Negative for congestion, postnasal drip, rhinorrhea, sneezing, sore throat, trouble swallowing and voice change.    Respiratory:  Negative for cough and shortness of breath.    Cardiovascular:  Negative for chest pain.   Gastrointestinal:  Negative for abdominal pain, constipation, diarrhea, nausea and vomiting.   Genitourinary:  Negative for difficulty urinating, dyspareunia, dysuria, frequency, genital sores, menstrual problem, pelvic pain, vaginal bleeding, vaginal discharge and vaginal pain.   Musculoskeletal:  Negative for arthralgias and myalgias.   Neurological:  Negative for dizziness, syncope and headaches.   Hematological:  Negative for adenopathy.   All other systems reviewed and are negative.    Physical Exam:     Vitals:  /72

## 2025-04-18 ENCOUNTER — RESULTS FOLLOW-UP (OUTPATIENT)
Dept: OBGYN CLINIC | Age: 25
End: 2025-04-18

## 2025-04-18 DIAGNOSIS — B96.89 BACTERIAL VAGINITIS: Primary | ICD-10-CM

## 2025-04-18 DIAGNOSIS — N76.0 BACTERIAL VAGINITIS: Primary | ICD-10-CM

## 2025-04-18 LAB
CLUE CELLS VAG QL WET PREP: ABNORMAL
T VAGINALIS VAG QL WET PREP: ABNORMAL
TRICHOMONAS VAGINALIS SCREEN: NEGATIVE
YEAST VAG QL WET PREP: ABNORMAL

## 2025-04-18 RX ORDER — METRONIDAZOLE 500 MG/1
500 TABLET ORAL 2 TIMES DAILY
Qty: 14 TABLET | Refills: 0 | Status: SHIPPED | OUTPATIENT
Start: 2025-04-18 | End: 2025-04-25

## 2025-04-18 NOTE — RESULT ENCOUNTER NOTE
Wet Prep:  Clue Cells  Rx:  Flagyl 500m tablet BID x7 days  Pharmacy:    [id number] - [place], [place] - [address] [facility] [phone] - F [phone]  [address] [place] [address]  [facility] [address]  Phone: [phone] Fax: [phone]

## 2025-04-22 LAB
C TRACH DNA CVX QL NAA+PROBE: NEGATIVE
N GONORRHOEA DNA SPEC QL NAA+PROBE: NEGATIVE

## 2025-05-28 ENCOUNTER — E-VISIT (OUTPATIENT)
Dept: OBGYN CLINIC | Age: 25
End: 2025-05-28
Payer: OTHER GOVERNMENT

## 2025-05-28 DIAGNOSIS — N89.8 VAGINAL DISCHARGE: Primary | ICD-10-CM

## 2025-05-28 DIAGNOSIS — N89.8 VAGINAL ODOR: ICD-10-CM

## 2025-05-28 DIAGNOSIS — B37.31 CANDIDAL VAGINITIS: ICD-10-CM

## 2025-05-28 DIAGNOSIS — N76.0 BACTERIAL VAGINITIS: ICD-10-CM

## 2025-05-28 DIAGNOSIS — N89.8 VAGINAL ITCHING: ICD-10-CM

## 2025-05-28 DIAGNOSIS — B96.89 BACTERIAL VAGINITIS: ICD-10-CM

## 2025-05-28 PROCEDURE — 99422 OL DIG E/M SVC 11-20 MIN: CPT | Performed by: ADVANCED PRACTICE MIDWIFE

## 2025-05-28 RX ORDER — METRONIDAZOLE 500 MG/1
500 TABLET ORAL 2 TIMES DAILY
Qty: 14 TABLET | Refills: 0 | Status: SHIPPED | OUTPATIENT
Start: 2025-05-28 | End: 2025-06-04

## 2025-05-28 RX ORDER — FLUCONAZOLE 150 MG/1
TABLET ORAL
Qty: 3 TABLET | Refills: 1 | Status: SHIPPED | OUTPATIENT
Start: 2025-05-28 | End: 2025-08-26

## 2025-05-28 NOTE — PATIENT INSTRUCTIONS
Thank you for submitting an Evisit.  I'm sorry that you aren't feeling well.  Based upon your responses, I think that you most likely have vaginal candidiasis (yeast infection) and bacterial vaginosis (imbalance in the normal vaginal bacteria).      I have sent a prescription for fluconazole tablets (Diflucan) and metronidazole tablets (Flagyl) to your pharmacy.      Please contact my office if your symptoms do not improve within 5-7 days.  Please seek more urgent medical attention if you develop any of the following:  Fever  Pain in your lower belly  Abnormal bleeding    Sincerely,  DIMITRIOS Gutierrez CNM     Vaginitis: Care Instructions       Vaginitis is soreness or infection of the vagina. This common problem can cause itching and burning. And it can cause a change in vaginal discharge. Sometimes it can cause pain during sex. Vaginitis may be caused by bacteria, yeast, or other germs. Some infections that cause it are caught from a sexual partner. Bath products, spermicides, and douches can irritate the vagina too.  Some women have this problem during and after menopause. A drop in estrogen levels during this time can cause dryness, soreness, and pain during sex.    How can you care for yourself at home?  If your doctor prescribed antibiotics, take them as directed. Do not stop taking them just because you feel better. You need to take the full course of antibiotics.  Do not eat or drink anything that has alcohol if you are taking metronidazole (Flagyl).  Wash your vaginal area daily with water. You also can use a mild, unscented soap if you want.  Do not use scented bath products. And do not use vaginal sprays or douches.  Put a washcloth soaked in cool water on the area to relieve itching. Or you can take cool baths.  If you have dryness because of menopause, use estrogen cream or pills that your doctor prescribes.  Use a personal lubricant before sex if you have dryness. Examples are LEDY Nobles

## 2025-05-28 NOTE — PROGRESS NOTES
Martinaddi Olverasesartonie Lopez (2000) initiated an asynchronous digital communication through [employer].    HPI: per patient questionnaire     Exam: not applicable    Diagnoses and all orders for this visit:  Diagnoses and all orders for this visit:    Vaginal discharge    Vaginal odor    Vaginal itching    Bacterial vaginitis  -     metroNIDAZOLE (FLAGYL) 500 MG tablet; Take 1 tablet by mouth in the morning and 1 tablet in the evening. Do all this for 7 days.    Candidal vaginitis  -     fluconazole (DIFLUCAN) 150 MG tablet; Take 1 tablet every 3 days for 3 doses.  For example:  Take on Monday, Thursday, Sunday.          15 minutes were spent on the digital evaluation and management of this patient.    DIMITRIOS Gutierrez CNM